# Patient Record
Sex: MALE | Race: OTHER | NOT HISPANIC OR LATINO
[De-identification: names, ages, dates, MRNs, and addresses within clinical notes are randomized per-mention and may not be internally consistent; named-entity substitution may affect disease eponyms.]

---

## 2020-06-22 PROBLEM — Z00.00 ENCOUNTER FOR PREVENTIVE HEALTH EXAMINATION: Status: ACTIVE | Noted: 2020-06-22

## 2020-06-23 ENCOUNTER — RESULT REVIEW (OUTPATIENT)
Age: 30
End: 2020-06-23

## 2020-06-23 ENCOUNTER — APPOINTMENT (OUTPATIENT)
Dept: ORTHOPEDIC SURGERY | Facility: CLINIC | Age: 30
End: 2020-06-23
Payer: COMMERCIAL

## 2020-06-23 ENCOUNTER — OUTPATIENT (OUTPATIENT)
Dept: OUTPATIENT SERVICES | Facility: HOSPITAL | Age: 30
LOS: 1 days | End: 2020-06-23
Payer: COMMERCIAL

## 2020-06-23 VITALS
SYSTOLIC BLOOD PRESSURE: 105 MMHG | HEIGHT: 67 IN | OXYGEN SATURATION: 98 % | WEIGHT: 155 LBS | BODY MASS INDEX: 24.33 KG/M2 | DIASTOLIC BLOOD PRESSURE: 70 MMHG | HEART RATE: 66 BPM

## 2020-06-23 DIAGNOSIS — G89.29 CERVICALGIA: ICD-10-CM

## 2020-06-23 DIAGNOSIS — M54.2 CERVICALGIA: ICD-10-CM

## 2020-06-23 PROCEDURE — 72084 X-RAY EXAM ENTIRE SPI 6/> VW: CPT | Mod: 26

## 2020-06-23 PROCEDURE — 72082 X-RAY EXAM ENTIRE SPI 2/3 VW: CPT

## 2020-06-23 PROCEDURE — 99204 OFFICE O/P NEW MOD 45 MIN: CPT

## 2020-06-23 PROCEDURE — 72100 X-RAY EXAM L-S SPINE 2/3 VWS: CPT

## 2020-07-01 ENCOUNTER — APPOINTMENT (OUTPATIENT)
Dept: ORTHOPEDIC SURGERY | Facility: CLINIC | Age: 30
End: 2020-07-01
Payer: COMMERCIAL

## 2020-07-01 DIAGNOSIS — Z78.9 OTHER SPECIFIED HEALTH STATUS: ICD-10-CM

## 2020-07-01 DIAGNOSIS — Z60.2 PROBLEMS RELATED TO LIVING ALONE: ICD-10-CM

## 2020-07-01 DIAGNOSIS — Z87.39 PERSONAL HISTORY OF OTHER DISEASES OF THE MUSCULOSKELETAL SYSTEM AND CONNECTIVE TISSUE: ICD-10-CM

## 2020-07-01 PROCEDURE — 99214 OFFICE O/P EST MOD 30 MIN: CPT

## 2020-07-01 SDOH — SOCIAL STABILITY - SOCIAL INSECURITY: PROBLEMS RELATED TO LIVING ALONE: Z60.2

## 2020-07-14 ENCOUNTER — APPOINTMENT (OUTPATIENT)
Dept: PHYSICAL MEDICINE AND REHAB | Facility: CLINIC | Age: 30
End: 2020-07-14

## 2020-07-24 PROBLEM — Z78.9 NON-SMOKER: Status: ACTIVE | Noted: 2020-07-24

## 2020-07-24 PROBLEM — Z60.2 LIVES ALONE: Status: ACTIVE | Noted: 2020-07-24

## 2020-07-24 PROBLEM — Z87.39 HISTORY OF HERNIATED INTERVERTEBRAL DISC: Status: RESOLVED | Noted: 2020-07-24 | Resolved: 2020-07-24

## 2020-07-24 PROBLEM — M54.2 CHRONIC NECK PAIN: Status: ACTIVE | Noted: 2020-07-01

## 2020-07-31 ENCOUNTER — APPOINTMENT (OUTPATIENT)
Dept: PHYSICAL MEDICINE AND REHAB | Facility: CLINIC | Age: 30
End: 2020-07-31
Payer: COMMERCIAL

## 2020-07-31 VITALS — HEIGHT: 67 IN | WEIGHT: 155 LBS | OXYGEN SATURATION: 98 % | RESPIRATION RATE: 16 BRPM | BODY MASS INDEX: 24.33 KG/M2

## 2020-07-31 DIAGNOSIS — M54.5 LOW BACK PAIN: ICD-10-CM

## 2020-07-31 DIAGNOSIS — G89.29 LOW BACK PAIN: ICD-10-CM

## 2020-07-31 PROCEDURE — 99203 OFFICE O/P NEW LOW 30 MIN: CPT

## 2020-08-04 RX ORDER — DAPSONE 75 MG/G
7.5 GEL TOPICAL
Qty: 90 | Refills: 0 | Status: ACTIVE | COMMUNITY
Start: 2020-05-29

## 2020-08-04 RX ORDER — CLINDAMYCIN PHOSPHATE 1 G/10ML
1 GEL TOPICAL
Qty: 30 | Refills: 0 | Status: ACTIVE | COMMUNITY
Start: 2020-07-07

## 2020-08-04 RX ORDER — RIFAXIMIN 550 MG/1
550 TABLET ORAL
Qty: 42 | Refills: 0 | Status: ACTIVE | COMMUNITY
Start: 2020-07-15

## 2020-08-04 RX ORDER — TAZAROTENE 1 MG/G
0.1 GEL CUTANEOUS
Qty: 30 | Refills: 0 | Status: ACTIVE | COMMUNITY
Start: 2020-07-07

## 2020-08-04 NOTE — PHYSICAL EXAM
[de-identified] : Physical Exam:\par \par General: patient is well developed, well nourished, in no acute \par distress, alert and oriented x 3. \par \par Mood and affect: normal\par \par Respiratory: no respiratory distress noted\par \par Skin: no scars over spine, skin intact, no erythema, increased warmth\par \par Alignment:The spine is well compensated in the coronal and sagittal plane. There is no asymmetry on Taylor Forward Bend Test.\par \par Gait: The patient is able to toe walk and heel walk without difficulty. The patient is able to tandem walk without difficulty.\par \par Palpation: no tenderness to palpation midline along spine or paraspinal region\par \par Range of motion: Cervical and Lumbar spine ROM is full\par \par Neurologic Exam:\par Motor: Manual Muscle testing in the upper and lower extremities is 5 out of 5 in all muscle groups. There is no evidence of muscular atrophy in the upper extremities. Sensory: Sensation to light touch is grossly intact in the upper and lower extremities\par \par Reflexes: DTR are present and symmetric throughout, negative alfaro bilaterally, negative inverted radial reflex bilaterally, no clonus, plantar responses are flexor\par \par Special tests: Spurlings sign absent. Lhermitte's sign is absent. Straight Leg Raise Negative, Cross Straight Leg Raise Negative, TRENTON Test Negative\par \par Hip Exam: Full painless ROM of bilateral hips\par \par Vascular: Examination of the peripheral vascular system demonstrates no evidence of congestion or edema. no lymphedema bilateral lower extremities, pulses are present and symmetric in both lower extremities.\par \par \par  [de-identified] : XR thoracolumbar 6/2020: mild multilevel degenerative changes, no dynamic instability, mild thoracolumbar levocurvature, no fractures seen

## 2020-08-04 NOTE — HISTORY OF PRESENT ILLNESS
[de-identified] : Mr. CLARK is a very pleasant 30 year old male who complains of neck and low back pain for approximately 5 years. On initial presentation symptoms were as follows: Patient described the pain as intermittent.  Patient rated the pain as moderate/severe.  The pain localized to lower back and neck.  There is no radiation of pain.  Patient  denies extremity numbness and paresthesias. \par \par The patient reports no loss of hand dexterity.\par The patient states there no loss of balance when walking.\par There no sensory loss in the arms or legs\par The patent no difficulty with urination.\par \par The patient no history of previous spine surgery.\par \par The patient has no history of unexpected weight loss, no history of active cancer, no history bladder or bowel dysfunction, no night pain, no fevers or chills.\par \par The past medical history, surgical history, family history, allergies, medications, 10+ point review of systems, family history and social history were reviewed and non contributory.\par \par

## 2020-08-04 NOTE — DISCUSSION/SUMMARY
[de-identified] : Discussed the results of the patient's history, physical exam, and imaging. Mr. Deluca has been suffering from nonradiating neck and back pain for approximately 5 years. Neurologically intact on exam. MRI cervical already ordered, scheduled for later today. I am also recommending MRI lumbar without contrast, order given. The patient will follow up with me after imaging is completed, sooner if there is an issue.  All questions were answered.\par \par

## 2020-08-04 NOTE — HISTORY OF PRESENT ILLNESS
[de-identified] : Follow up 7/1/20: Patient presents for follow up. Still having neck and low back pain, nonradiating. Denies new neurologic symptoms. Here to review imaging.\par \par Initial 6/2020: Mr. CLARK is a very pleasant 30 year old male who complains of neck and low back pain for approximately 5 years. On initial presentation symptoms were as follows: Patient described the pain as intermittent.  Patient rated the pain as moderate/severe.  The pain localized to lower back and neck.  There is no radiation of pain.  Patient  denies extremity numbness and paresthesias. \par \par The patient reports no loss of hand dexterity.\par The patient states there no loss of balance when walking.\par There no sensory loss in the arms or legs\par The patent no difficulty with urination.\par \par The patient no history of previous spine surgery.\par \par The patient has no history of unexpected weight loss, no history of active cancer, no history bladder or bowel dysfunction, no night pain, no fevers or chills.\par \par The past medical history, surgical history, family history, allergies, medications, 10+ point review of systems, family history and social history were reviewed and non contributory.\par \par

## 2020-08-04 NOTE — PHYSICAL EXAM
[de-identified] : Physical Exam:\par \par General: patient is well developed, well nourished, in no acute \par distress, alert and oriented x 3. \par \par Mood and affect: normal\par \par Respiratory: no respiratory distress noted\par \par Skin: no scars over spine, skin intact, no erythema, increased warmth\par \par Alignment:The spine is well compensated in the coronal and sagittal plane. There is no asymmetry on Taylor Forward Bend Test.\par \par Gait: The patient is able to toe walk and heel walk without difficulty. The patient is able to tandem walk without difficulty.\par \par Palpation: no tenderness to palpation midline along spine or paraspinal region\par \par Range of motion: Cervical and Lumbar spine ROM is full\par \par Neurologic Exam:\par Motor: Manual Muscle testing in the upper and lower extremities is 5 out of 5 in all muscle groups. There is no evidence of muscular atrophy in the upper extremities. Sensory: Sensation to light touch is grossly intact in the upper and lower extremities\par \par Reflexes: DTR are present and symmetric throughout, negative alfaro bilaterally, negative inverted radial reflex bilaterally, no clonus, plantar responses are flexor\par \par Special tests: Spurlings sign absent. Lhermitte's sign is absent. Straight Leg Raise Negative, Cross Straight Leg Raise Negative, TRENTON Test Negative\par \par Hip Exam: Full painless ROM of bilateral hips\par \par Vascular: Examination of the peripheral vascular system demonstrates no evidence of congestion or edema. no lymphedema bilateral lower extremities, pulses are present and symmetric in both lower extremities.\par \par \par  [de-identified] : MRI cervical 6/2020: C4/C5 and C5/C6 disc bulges, C5/C6 superimposed small left paracentral disc herniation with moderate left foraminal stenosis; no cord compression or cord signal change\par \par MRI lumbar 6/2020: L4/L5 and L5/S1 small right paracentral disc protrusions with mild right foraminal stenosis\par \par XR thoracolumbar 6/2020: mild multilevel degenerative changes, no dynamic instability, mild thoracolumbar levocurvature, no fractures seen